# Patient Record
Sex: FEMALE | Race: WHITE | NOT HISPANIC OR LATINO | Employment: OTHER | URBAN - NONMETROPOLITAN AREA
[De-identification: names, ages, dates, MRNs, and addresses within clinical notes are randomized per-mention and may not be internally consistent; named-entity substitution may affect disease eponyms.]

---

## 2022-04-27 ENCOUNTER — TELEPHONE (OUTPATIENT)
Dept: SURGERY | Facility: CLINIC | Age: 77
End: 2022-04-27

## 2022-05-06 NOTE — TELEPHONE ENCOUNTER
PATIENT CALLED BACK AND STATED THAT SHE HAS A LETTER THAT STATES SHE WILL NOT HAVE TO HAVE A COLONOSCOPY FOR 10 YRS FROM  2015.